# Patient Record
Sex: MALE | Race: WHITE | NOT HISPANIC OR LATINO | URBAN - METROPOLITAN AREA
[De-identification: names, ages, dates, MRNs, and addresses within clinical notes are randomized per-mention and may not be internally consistent; named-entity substitution may affect disease eponyms.]

---

## 2020-01-30 ENCOUNTER — EMERGENCY (EMERGENCY)
Facility: HOSPITAL | Age: 70
LOS: 1 days | Discharge: HOME | End: 2020-01-30
Attending: EMERGENCY MEDICINE | Admitting: EMERGENCY MEDICINE
Payer: MEDICARE

## 2020-01-30 VITALS
SYSTOLIC BLOOD PRESSURE: 137 MMHG | WEIGHT: 186.95 LBS | TEMPERATURE: 98 F | RESPIRATION RATE: 15 BRPM | OXYGEN SATURATION: 99 % | DIASTOLIC BLOOD PRESSURE: 83 MMHG | HEIGHT: 72 IN | HEART RATE: 68 BPM

## 2020-01-30 VITALS
HEART RATE: 64 BPM | DIASTOLIC BLOOD PRESSURE: 77 MMHG | RESPIRATION RATE: 18 BRPM | OXYGEN SATURATION: 98 % | SYSTOLIC BLOOD PRESSURE: 126 MMHG | TEMPERATURE: 98 F

## 2020-01-30 DIAGNOSIS — S61.213A LACERATION WITHOUT FOREIGN BODY OF LEFT MIDDLE FINGER WITHOUT DAMAGE TO NAIL, INITIAL ENCOUNTER: ICD-10-CM
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
## 2020-01-30 DIAGNOSIS — S60.445A EXTERNAL CONSTRICTION OF LEFT RING FINGER, INITIAL ENCOUNTER: ICD-10-CM

## 2020-01-30 DIAGNOSIS — Z23 ENCOUNTER FOR IMMUNIZATION: ICD-10-CM

## 2020-01-30 DIAGNOSIS — S61.217A LACERATION WITHOUT FOREIGN BODY OF LEFT LITTLE FINGER WITHOUT DAMAGE TO NAIL, INITIAL ENCOUNTER: ICD-10-CM
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
## 2020-01-30 DIAGNOSIS — S61.215A LACERATION WITHOUT FOREIGN BODY OF LEFT RING FINGER WITHOUT DAMAGE TO NAIL, INITIAL ENCOUNTER: ICD-10-CM

## 2020-01-30 PROCEDURE — 99283 EMERGENCY DEPT VISIT LOW MDM: CPT | Mod: 25

## 2020-01-30 PROCEDURE — 73130 X-RAY EXAM OF HAND: CPT | Mod: 26,LT

## 2020-01-30 RX ORDER — CEPHALEXIN 500 MG
500 CAPSULE ORAL ONCE
Refills: 0 | Status: COMPLETED | OUTPATIENT
Start: 2020-01-30 | End: 2020-01-30

## 2020-01-30 RX ORDER — IBUPROFEN 200 MG
400 TABLET ORAL ONCE
Refills: 0 | Status: COMPLETED | OUTPATIENT
Start: 2020-01-30 | End: 2020-01-30

## 2020-01-30 RX ORDER — TETANUS TOXOID, REDUCED DIPHTHERIA TOXOID AND ACELLULAR PERTUSSIS VACCINE, ADSORBED 5; 2.5; 8; 8; 2.5 [IU]/.5ML; [IU]/.5ML; UG/.5ML; UG/.5ML; UG/.5ML
0.5 SUSPENSION INTRAMUSCULAR ONCE
Refills: 0 | Status: COMPLETED | OUTPATIENT
Start: 2020-01-30 | End: 2020-01-30

## 2020-01-30 RX ORDER — CEPHALEXIN 500 MG
1 CAPSULE ORAL
Qty: 28 | Refills: 0
Start: 2020-01-30 | End: 2020-02-05

## 2020-01-30 RX ADMIN — Medication 500 MILLIGRAM(S): at 20:43

## 2020-01-30 RX ADMIN — Medication 400 MILLIGRAM(S): at 20:43

## 2020-01-30 RX ADMIN — TETANUS TOXOID, REDUCED DIPHTHERIA TOXOID AND ACELLULAR PERTUSSIS VACCINE, ADSORBED 0.5 MILLILITER(S): 5; 2.5; 8; 8; 2.5 SUSPENSION INTRAMUSCULAR at 20:43

## 2020-01-30 NOTE — ED ADULT NURSE REASSESSMENT NOTE - NS ED NURSE REASSESS COMMENT FT1
Dr. Hanna just finished suturing hand. Tetanus, Motrin, and Keflex administered. Pt states he is not currently in any pn. Will continue to monitor. Side rails up, safety is maintained.

## 2020-01-30 NOTE — ED PROVIDER NOTE - SKIN, MLM
LUE: .25 cm superficial laceration over dorsum of left 3rd digit. left 4th digit with 1 cm laceration with irregular margin overlying PIP with active venous bleed; able to flex and extend at DIP and PIP. left 5th digit with 1 cm laceration over lateral DIP, no active bleeding, able to flex and extend at DIP and PIP LUE: .25 cm superficial laceration over dorsum of left 3rd digit. left 4th digit with 1 cm laceration with irregular margin overlying PIP with active venous bleed; able to flex and extend at DIP and PIP. left 5th digit with 1 cm laceration over lateral PIP, no active bleeding, able to flex and extend at DIP and PIP.  Cap refill and distal tip sensation intact.

## 2020-01-30 NOTE — ED PROVIDER NOTE - DIAGNOSTIC INTERPRETATION
ER Physician: Monster Montiel  INTERPRETATION:  no acute fracture; no soft tissue swelling noted; normal bony alignment.  ? punctate fb on the radial aspect of the 5th digit

## 2020-01-30 NOTE — ED PROVIDER NOTE - OBJECTIVE STATEMENT
61 yo RHD male presents with lacerations to left 3rd, 4th and 5th digits. Pt sustained laceration last night at approximately 9:30PM when he cut his fingers on a broken pyrex bowl. Pt 59 yo RHD male presents with lacerations to left 3rd, 4th and 5th digits. Pt sustained laceration last night at approximately 9:30PM when he cut his fingers on a broken pyrex bowl. Pt ran fingers under cold water and wrapped fingers with bandaids. Pt reports increasing pain to fingers and went to [facility] for evaluation 3:30PM. Tetanus status is unknown. 70 yo RHD male presents with lacerations to left 3rd, 4th and 5th digits. Pt sustained laceration last night at approximately 9:30PM when he cut his fingers on a broken pyrex bowl. Pt ran fingers under cold water and wrapped fingers with bandaids. Pt reports increasing pain to fingers and went to [facility] for evaluation 3:30PM. Tetanus status is unknown.  Denies fingertip numbness or paresthesias.

## 2020-01-30 NOTE — ED ADULT TRIAGE NOTE - CHIEF COMPLAINT QUOTE
left hand laceration (cut with glass baking dish last pm), sent from urgent care for eval (unable to remove wedding ring d/t swelling)

## 2020-01-30 NOTE — ED PROVIDER NOTE - CLINICAL SUMMARY MEDICAL DECISION MAKING FREE TEXT BOX
59 yo RHD male presents with left hand injury. Pt was cut with sharp object at approximately 9PM last night. bleeding and pain improved last night, however pt reported worsening pain and swelling at approximately 3PM today. Pt was sent in from [facility] for evaluation. Pt with ring on left ring finger; unable to remove. Pt consent for ring removal with ring drill; ring removal successful, see procedure note. Other hand injuries repaired by Dr. Hanna; pt will follow up with Dr Hanna for wound care and suture removal. Will update Tetanus. xray of hand ordered to rule out foreign body; IBU and keflex ordered. 68 yo RHD male presents with left hand injury. Pt was cut with sharp object at approximately 9PM last night. bleeding and pain improved last night, however pt reported worsening pain at approximately 3PM today which prompted him to get evaluated.  Pt was sent in from [facility] for evaluation. Pt with ring on left ring finger and unable to remove. Patient requested and consented for ring removal with ring drill; ring removal successful, see procedure note for details. Other hand injuries repaired by Dr. Hanna;  pt will follow up with Dr Hanna for wound care and suture removal. Will update Tetanus. xray of hand with evidence of ? small punctate FB on the radial aspect of the fifth digit.  Conservative management discussed with the patient in detail.   Strict ED return instructions discussed in detail and patient given the opportunity to ask any questions about their discharge diagnosis and instructions

## 2020-01-30 NOTE — ED PROVIDER NOTE - PATIENT PORTAL LINK FT
You can access the FollowMyHealth Patient Portal offered by [facility] by registering at the following website: [email or web address]. By joining [employer]’s FollowMyHealth portal, you will also be able to view your health information using other applications (apps) compatible with our system.

## 2020-01-30 NOTE — ED PROVIDER NOTE - NSFOLLOWUPINSTRUCTIONS_ED_ALL_ED_FT
Please follow up with Dr. Hanna as instructed.  Take the medication as prescribed.      Laceration    A laceration is a cut that goes through all of the layers of the skin and into the tissue that is right under the skin. Some lacerations heal on their own. Others need to be closed with skin adhesive strips, skin glue, stitches (sutures), or staples. Proper laceration care minimizes the risk of infection and helps the laceration to heal better.  If non-absorbable stitches or staples have been placed, they must be taken out within the time frame instructed by your healthcare provider.    SEEK IMMEDIATE MEDICAL CARE IF YOU HAVE ANY OF THE FOLLOWING SYMPTOMS: swelling around the wound, worsening pain, drainage from the wound, red streaking going away from your wound, inability to move finger or toe near the laceration, or discoloration of skin near the laceration.

## 2020-06-05 ENCOUNTER — APPOINTMENT (OUTPATIENT)
Dept: PODIATRY | Facility: CLINIC | Age: 70
End: 2020-06-05
Payer: MEDICARE

## 2020-06-05 VITALS
DIASTOLIC BLOOD PRESSURE: 60 MMHG | SYSTOLIC BLOOD PRESSURE: 100 MMHG | WEIGHT: 180 LBS | HEIGHT: 72 IN | BODY MASS INDEX: 24.38 KG/M2

## 2020-06-05 DIAGNOSIS — Z78.9 OTHER SPECIFIED HEALTH STATUS: ICD-10-CM

## 2020-06-05 DIAGNOSIS — F41.9 ANXIETY DISORDER, UNSPECIFIED: ICD-10-CM

## 2020-06-05 DIAGNOSIS — S92.502A DISPLACED UNSPECIFIED FRACTURE OF LEFT LESSER TOE(S), INITIAL ENCOUNTER FOR CLOSED FRACTURE: ICD-10-CM

## 2020-06-05 DIAGNOSIS — E78.00 PURE HYPERCHOLESTEROLEMIA, UNSPECIFIED: ICD-10-CM

## 2020-06-05 PROBLEM — Z00.00 ENCOUNTER FOR PREVENTIVE HEALTH EXAMINATION: Status: ACTIVE | Noted: 2020-06-05

## 2020-06-05 PROCEDURE — 73630 X-RAY EXAM OF FOOT: CPT

## 2020-06-05 PROCEDURE — 28510 TREATMENT OF TOE FRACTURE: CPT

## 2020-06-05 PROCEDURE — 99203 OFFICE O/P NEW LOW 30 MIN: CPT | Mod: 25

## 2020-06-05 RX ORDER — ATORVASTATIN CALCIUM 10 MG/1
10 TABLET, FILM COATED ORAL
Refills: 0 | Status: ACTIVE | COMMUNITY

## 2020-06-05 RX ORDER — BUPROPION HYDROCHLORIDE 300 MG/1
300 TABLET, EXTENDED RELEASE ORAL
Refills: 0 | Status: ACTIVE | COMMUNITY

## 2020-06-05 RX ORDER — GABAPENTIN 600 MG/1
600 TABLET, FILM COATED ORAL
Refills: 0 | Status: ACTIVE | COMMUNITY

## 2020-06-05 NOTE — PROCEDURE
[FreeTextEntry1] : X-rays were taken in the office multiple views of the left foot\par There is evidence of a longitudinal fracture of the proximal phalanx \par \par after examination and reviewing of the x-rays with the patient it was determined that there was a fracture present. Possible etiologies and treatment options both conservative as well as surgical were discussed at length with the patient. Approximate healing times were discussed with the patient both surgical as well as conservative treatments. It was stressed that these are estimates and there are many variables regarding healing the patient to patient. The fracture care, fracture principles, and the following appropriate treatment advice was stressed to the patient. Compliance was also stressed. I went over my treatment suggestions which included but were not limited to be my advice on which treatment the patient should pursue, appropriate offloading if necessary, shoe gear and assistive devices, immobilization or splinting and possible orthotic therapy. I advised the patient that medical noncompliance regarding treatment and treatment suggestions could result in worsening of the fracture which could require future surgical intervention, nonhealing of the fracture, and long-term deformity and disability as well as chronic pain.\par A complete and thorough evaluation of the type of shoes they should be wearing and type of shoes for this time of year was discussed with patient.\par \par I have advised the patient that they may return to normal activity level but to limit it to tolerance. I advised them at this time that they do not need any assistive device special shoe bandaging or bracing. I also advised that should they be doing her normal daily function and they should experience some pain that upon finishing that they should return to anti-inflammatory medication over-the-counter if possible, ice and elevation. If this continues more than 24-48 hours he should contact the office immediately for followup appointment\par

## 2020-06-05 NOTE — REVIEW OF SYSTEMS
[Anxiety] : anxiety [As Noted in HPI] : as noted in HPI [Negative] : Neurological [de-identified] : mild evidence of contusion of nailbed

## 2020-06-05 NOTE — PHYSICAL EXAM
[General Appearance - In No Acute Distress] : in no acute distress [General Appearance - Alert] : alert [Full Pulse] : the pedal pulses are present [Edema] : there was no peripheral edema [Sensation] : the sensory exam was normal to light touch and pinprick [Deep Tendon Reflexes (DTR)] : deep tendon reflexes were 2+ and symmetric [No Focal Deficits] : no focal deficits [Oriented To Time, Place, And Person] : oriented to person, place, and time [Impaired Insight] : insight and judgment were intact [Affect] : the affect was normal [FreeTextEntry1] : Lethargic again noted, fourth toe left foot with fusiform swelling, ecchymosis and a mild abrasion to the tone some dried blood underneath. There is no deviation in the joint, both PIP and DIPs, no subluxation or dislocation evident

## 2020-06-05 NOTE — HISTORY OF PRESENT ILLNESS
[FreeTextEntry1] : Location:4 the prox phalkanx left foot\par Duration: 2 days, went to [facility], xrays N/A\par Acute:yes\par Past Tx: [facility], xrays\par Exacerbated by:\par Prior Hx:\par

## 2020-09-16 ENCOUNTER — APPOINTMENT (OUTPATIENT)
Dept: PODIATRY | Facility: CLINIC | Age: 70
End: 2020-09-16
Payer: MEDICARE

## 2020-09-16 VITALS — HEIGHT: 72 IN | WEIGHT: 180 LBS | BODY MASS INDEX: 24.38 KG/M2

## 2020-09-16 PROCEDURE — 17110 DESTRUCTION B9 LES UP TO 14: CPT

## 2020-09-16 PROCEDURE — 99213 OFFICE O/P EST LOW 20 MIN: CPT | Mod: 25

## 2020-09-16 NOTE — PHYSICAL EXAM
[General Appearance - In No Acute Distress] : in no acute distress [General Appearance - Alert] : alert [No Joint Swelling] : no joint swelling [Normal Foot/Ankle] : Both lower extremities were exposed and visualized. Standing exam demonstrates normal foot posture and alignment. Hindfoot exam shows no hindfoot valgus or varus [Skin Color & Pigmentation] : normal skin color and pigmentation [Skin Turgor] : normal skin turgor [] : no rash [Sensation] : the sensory exam was normal to light touch and pinprick [Motor Exam] : the motor exam was normal [Deep Tendon Reflexes (DTR)] : deep tendon reflexes were 2+ and symmetric [No Focal Deficits] : no focal deficits [Oriented To Time, Place, And Person] : oriented to person, place, and time [Impaired Insight] : insight and judgment were intact [Affect] : the affect was normal [FreeTextEntry3] : Vascular exam reveals palpable pedal pulses, the foot is warm to touch, there was good capillary fill time, the skin is normal in appearance there is no evidence of vascular disease or compromise at this time [Foot Ulcer] : no foot ulcer [Skin Induration] : no skin induration [FreeTextEntry1] : visual exam of the area exhibits interruption of skin lines and capillary bleeding, upon debridemment, and classic characteristics seen in a verruca, cauliflower-like appearance, pain on side to side compression but only to the 5th toe. The other two are simple HK from old sx\par

## 2020-09-16 NOTE — PROCEDURE
[FreeTextEntry1] : Plantar wart \par          Clinical Notes: I had a lengthy discussion with the patient today regarding the diagnosis, etiology, differential diagnosis and treatment options of the presenting problem. Risks alternatives and benefits of each treatment which ranged from conservative to surgical were explained in great detail. I also explained the progression of treatment from conservative to surgical and the benefits of each. I stressed conservative treatment (and explained them again) until it no longer provides relief. OTC products and meds discussed and reviewed. All questions asked and answered appropriately to the patient's satisfaction. , I had a lengthy discussion with the patient today regarding the diagnosis, etiology, differential diagnosis and treatment options of the presenting problem. Risks alternatives and benefits of each treatment which ranged from conservative to surgical were explained in great detail. I also explained the progression of treatment from conservative to surgical and the benefits of each. I stressed conservative treatment (and explained them again) until it no longer provides relief. OTC products and meds discussed and reviewed. All questions asked and answered appropriately to the patient's satisfaction., A lengthy informative and educational discussion discussed with the patient. Various treatment options were reviewed. I discussed simply shaving the lesion and applied Cantharone on a biweekly basis, discussed complete excision, another treatment option discussed with treatment the cryotherapy.\par \par Shaving of a benign lesion using a sterile scalpel the skin was debrided to capillary bleeding and Cantharone applied. A dry sterile bandage with discharge instructions was reviewed with the patient and a followup appointment given.. \par \par \par

## 2020-09-16 NOTE — HISTORY OF PRESENT ILLNESS
[FreeTextEntry1] : Location:plantar aspect of left foot x's 2 and left 5th toe laterally\par Duration: plantar : over 5 years hx of excision at those 2 spots, 5th toe "recently"\par Acute: toe\par Chronic: plantar\par Past Tx: excision for pantar and nothing for 5th toe\par Exacerbated by: walking barefoot\par Prior Hx: yes, plantarly only\par

## 2020-09-29 ENCOUNTER — APPOINTMENT (OUTPATIENT)
Dept: PODIATRY | Facility: CLINIC | Age: 70
End: 2020-09-29
Payer: MEDICARE

## 2020-09-29 VITALS — WEIGHT: 180 LBS | BODY MASS INDEX: 24.38 KG/M2 | HEIGHT: 72 IN

## 2020-09-29 PROCEDURE — 17110 DESTRUCTION B9 LES UP TO 14: CPT

## 2020-09-29 NOTE — PHYSICAL EXAM
[FreeTextEntry1] : visual exam of the area exhibits interruption of skin lines and capillary bleeding, upon debridemment, and classic characteristics seen in a verruca, cauliflower-like appearance, pain on side to side compression but only to the 5th toe.\par

## 2020-09-29 NOTE — PROCEDURE
[FreeTextEntry1] : Shaving of a benign lesion using a sterile scalpel the skin was debrided to capillary bleeding and Cantharone applied. A dry sterile bandage with discharge instructions was reviewed with the patient and a followup appointment given.. \par \par \par follow up appt 2 weeks\par

## 2020-09-29 NOTE — HISTORY OF PRESENT ILLNESS
[FreeTextEntry1] : Location left 5th toe laterally\par Acute: yes\par Chronic: plantar\par Past Tx: excision for pantar and nothing for 5th toe\par Exacerbated by: walking barefoot\par

## 2020-10-13 ENCOUNTER — APPOINTMENT (OUTPATIENT)
Dept: PODIATRY | Facility: CLINIC | Age: 70
End: 2020-10-13
Payer: MEDICARE

## 2020-10-13 VITALS
WEIGHT: 170 LBS | DIASTOLIC BLOOD PRESSURE: 60 MMHG | OXYGEN SATURATION: 99 % | HEIGHT: 72 IN | SYSTOLIC BLOOD PRESSURE: 110 MMHG | HEART RATE: 77 BPM | BODY MASS INDEX: 23.03 KG/M2

## 2020-10-13 DIAGNOSIS — B07.8 OTHER VIRAL WARTS: ICD-10-CM

## 2020-10-13 DIAGNOSIS — B07.0 PLANTAR WART: ICD-10-CM

## 2020-10-13 PROCEDURE — 99213 OFFICE O/P EST LOW 20 MIN: CPT

## 2020-10-13 NOTE — PROCEDURE
[FreeTextEntry1] : I lengthy discussion with the patient today regarding the causes, etiology, and treatment options for verruca plantaris. My discussion to focus mainly on prevention since there is a high recurrence rate from this type of virus. My discussion included but was not limited to overall foot health, foot hygiene, the risks of walking barefoot especially in public places and the need to be conscious of the cleanliness of facilities where not only in a walk barefoot but other people walk barefoot as well. All questions were asked and answered appropriately and educational literature was dispensed\par I had a lengthy discussion with the patient regarding the causes etiologies and treatment options for verruca plantaris. My discussion focused mainly on prevention since there is a high recurrence rate. My discussion included but was not limited to overall foot, the foot hygiene, the risks of walking barefoot especially in public places and the need to be conscious of the cleanliness of walking barefoot. All questions were asked and answered appropriately, and educational literature was supplied.\par \par \par \par follow up appt 4 weeks if any symptoms present\par

## 2020-10-13 NOTE — PHYSICAL EXAM
[No Joint Swelling] : no joint swelling [Normal Foot/Ankle] : Both lower extremities were exposed and visualized. Standing exam demonstrates normal foot posture and alignment. Hindfoot exam shows no hindfoot valgus or varus [Skin Color & Pigmentation] : normal skin color and pigmentation [Skin Turgor] : normal skin turgor [] : no rash [Skin Lesions] : no skin lesions [FreeTextEntry3] : Vascular exam reveals palpable pedal pulses, the foot is warm to touch, there was good capillary fill time, the skin is normal in appearance there is no evidence of vascular disease or compromise at this time [Foot Ulcer] : no foot ulcer [Skin Induration] : no skin induration [FreeTextEntry1] : visual exam of the area exhibits continuity skin lines and without capillary bleeding, upon debridement, \par

## 2023-04-20 ENCOUNTER — APPOINTMENT (OUTPATIENT)
Dept: NEUROLOGY | Facility: CLINIC | Age: 73
End: 2023-04-20
Payer: MEDICARE

## 2023-04-20 ENCOUNTER — NON-APPOINTMENT (OUTPATIENT)
Age: 73
End: 2023-04-20

## 2023-04-20 VITALS
SYSTOLIC BLOOD PRESSURE: 144 MMHG | HEIGHT: 72 IN | WEIGHT: 185 LBS | DIASTOLIC BLOOD PRESSURE: 81 MMHG | BODY MASS INDEX: 25.06 KG/M2 | OXYGEN SATURATION: 97 % | HEART RATE: 73 BPM

## 2023-04-20 DIAGNOSIS — R51.9 HEADACHE, UNSPECIFIED: ICD-10-CM

## 2023-04-20 DIAGNOSIS — G45.9 TRANSIENT CEREBRAL ISCHEMIC ATTACK, UNSPECIFIED: ICD-10-CM

## 2023-04-20 DIAGNOSIS — G25.81 RESTLESS LEGS SYNDROME: ICD-10-CM

## 2023-04-20 PROCEDURE — 99205 OFFICE O/P NEW HI 60 MIN: CPT

## 2023-04-20 RX ORDER — LAMOTRIGINE 25 MG/1
25 TABLET ORAL
Refills: 0 | Status: ACTIVE | COMMUNITY

## 2023-05-10 DIAGNOSIS — G50.1 ATYPICAL FACIAL PAIN: ICD-10-CM

## 2023-05-11 ENCOUNTER — RESULT REVIEW (OUTPATIENT)
Age: 73
End: 2023-05-11

## 2023-05-11 PROBLEM — G45.9 TRANSIENT CEREBRAL ISCHEMIA, UNSPECIFIED TYPE: Status: ACTIVE | Noted: 2023-05-11

## 2023-05-11 NOTE — REASON FOR VISIT
contrast induced     - IVF as per nephro  nephrology following   continue to monitor Cr [Consultation] : a consultation visit [FreeTextEntry1] : pt states he get headaches and hurts w/ cough  contrast induced     - IVF as per nephro  Bumex gtt as above. Discussed with Dr. Eid and hospitalist. Called and discussed with pts daughter Petty as well.  nephrology following   continue to monitor Cr

## 2023-05-11 NOTE — PHYSICAL EXAM
[FreeTextEntry1] : General: No acute distress, Awake, Alert.  \par \par \par Mental status  \par \par Awake, alert, and oriented to person, time and place, Normal attention span and concentration, Recent and remote memory intact, Language intact, Fund of knowledge intact.  \par \par Delayed recall 3/3  \par \par \par Cranial Nerves  \par \par II: VFF  \par \par III, IV, VI: PERRL, EOMI. \par \par V: Facial sensation is normal B/L.  \par \par VII: Facial strength is normal B/L.  \par \par VIII: Gross hearing is intact.  \par \par IX, X: Palate is midline and elevates symmetrically.  \par \par XI: Trapezius normal strength.  \par \par XII: Tongue midline without atrophy or fasciculations.  \par \par   \par \par Motor exam  \par \par Muscle tone - no evidence of rigidity or resistance in all 4 extremities.  \par No atrophy or fasciculations  \par Muscle Strength: arms and legs, proximal and distal flexors and extensors are normal  \par \par No UE drift. \par \par   \par \par Reflexes  \par \par All present, normal, and symmetrical.  \par Plantars right: mute.  \par Plantars left: mute.  \par \par   \par \par Coordination  \par \par Finger to nose: Normal.  \par Heel to shin: Normal.  \par   \par \par Sensory  \par \par Intact sensation to PP, vibration and cold.  \par \par \par \par Gait  \par \par Normal including heels, toes, and tandem gait. Romberg positive \par

## 2023-05-11 NOTE — ASSESSMENT
[FreeTextEntry1] : start taking gabapentin 600 mg BID to see if decrease in headache (currently on gabapentin 600 mg at bedtime)\par \par MRI w/wo contrast\par MRA w/o contrast\par Start headache diary\par Encouraged to increase water intake 2 L daily\par \par \par Follow up in 2 months

## 2023-05-11 NOTE — HISTORY OF PRESENT ILLNESS
[FreeTextEntry1] : Po Bradford is a 73 yo male with PMH anxiety, high cholesterol, atrial fibrillation s/p ablation, headaches here for initial consultation.\par \par Occasional headaches all his life however started to worsen in pandemic, would occasional take Excedrin with complete relief, location behind b/l eyes \par Currently experiencing pressure behind bilateral eyes every time he overexerts or coughs , headaches 1-2 x weekly in the same location that is not relieved with Excedrin, triggering factors include stress, no alleviating factors - no change or blurry vision\par denies nausea/ vomiting\par denies light or noise sensitivity\par transient symptoms of pressure and blurring with headaches\par occ without\par \par \par water intake 3 glasses daily\par caffeine 2 cups of coffee daily\par sleep intermittent varies, 4- hours sometimes more\par exercise little to none\par diet no meat, balanced, needs improvement with portion control\par work- remotely runs own business as , paints with oil based turpentine usually outside sometimes in garage\par seeing therapist on weekly basis\par psychiatrist- MD Paulie Sepulveda

## 2023-07-11 ENCOUNTER — APPOINTMENT (OUTPATIENT)
Dept: NEUROLOGY | Facility: CLINIC | Age: 73
End: 2023-07-11
Payer: MEDICARE

## 2023-07-11 DIAGNOSIS — G44.84 PRIMARY EXERTIONAL HEADACHE: ICD-10-CM

## 2023-07-11 PROCEDURE — 99214 OFFICE O/P EST MOD 30 MIN: CPT | Mod: 95

## 2023-07-11 RX ORDER — INDOMETHACIN 50 MG/1
50 CAPSULE ORAL
Qty: 60 | Refills: 0 | Status: ACTIVE | COMMUNITY
Start: 2023-07-11 | End: 1900-01-01

## 2023-07-11 RX ORDER — MIRTAZAPINE 15 MG/1
15 TABLET, FILM COATED ORAL
Qty: 30 | Refills: 0 | Status: ACTIVE | COMMUNITY
Start: 2023-06-26

## 2023-07-11 RX ORDER — ALBUTEROL SULFATE 90 UG/1
108 (90 BASE) INHALANT RESPIRATORY (INHALATION)
Qty: 7 | Refills: 0 | Status: ACTIVE | COMMUNITY
Start: 2023-06-05

## 2023-07-11 RX ORDER — GABAPENTIN 300 MG/1
300 CAPSULE ORAL
Qty: 120 | Refills: 0 | Status: ACTIVE | COMMUNITY
Start: 2023-01-23

## 2023-07-11 RX ORDER — BUPROPION HYDROCHLORIDE 150 MG/1
150 TABLET, FILM COATED, EXTENDED RELEASE ORAL
Qty: 60 | Refills: 0 | Status: ACTIVE | COMMUNITY
Start: 2023-01-23

## 2023-07-11 NOTE — REASON FOR VISIT
[Home] : at home, [unfilled] , at the time of the visit. [Medical Office: ([facility])___] : at the medical office located in  [Patient] : the patient [Follow-Up: _____] : a [unfilled] follow-up visit

## 2023-07-18 NOTE — DATA REVIEWED
[de-identified] : \par  MRI Report             Final\par \par No Documents Attached\par \par \par \par \par   [facility]\par                                          [address]\par                                    [place], [place]  [address]\par                                        Department of Radiology\par                                             [phone]\par \par \par Patient Name:      JUJU NESS                   Location:       [facility]\par Med Rec #:        [id number]                    Account #:      [id number]\par YOB: 1950                    Ordering:       Agustina Gonzalez NP\par Age: 72               Sex:    M                 Attending:      Agustina Gonzalez NP\par PCP:        Renato Childress MD\par ______________________________________________________________________________________\par \par Exam Date:      05/11/23\par Exam:         MRA BRAIN; MRI BRAIN WAW IC\par Order#:       MRI [id number]; [id number]\par \par \par \par CLINICAL INDICATION: Headache\par \par  Technique: Contrast brain MRI, non-contrast brain MRA was performed.  MR angiography of\par intracranial circulation was performed with time of flight imaging technique. Maximal intensity\par projection images were reviewed in multiple planes.\par \par  7 cc of Gadavist were administered intravenously, 0.5 cc were discarded.\par \par  COMPARISON: None.\par \par  FINDINGS:\par \par  Brain MRI:\par  Mild prominence of the sulci and ventricles are consistent with age-appropriate volume loss. Few\par scattered tiny SWI hypointensities in both cerebral and cerebellar hemispheres likely represent hem\par osiderin deposition due to old microhemorrhages. There is no intraparenchymal hematoma, mass effect\par or midline shift. No areas of abnormal restrictive diffusion are present to suggest acute or\par subacute infarction. No abnormal extra-axial fluid collections are present.\par \par  There is a 5 mm well-circumscribed cystic lesion in the sella situated between the anterior and\par intermediate lobes of the pituitary, favoring a Rathke's cleft cyst.\par \par  The calvarium is intact. Orbits, paranasal sinuses and tympanomastoid cavities appear free of acute\par disease. There is minimal mucosal thickening within the bilateral ethmoid air cells and small mucous\par retention cysts versus polyps in the sphenoid and left maxillary sinuses.\par \par  Brain MRA:\par  Internal carotid arteries demonstrate unremarkable antegrade flow related enhancement to the [place]\par of [place] bilaterally. The anterior and middle cerebral arteries  and anterior communicating artery\par are unremarkable. There is no evidence of aneurysm, vascular malformation or occlusion.\par \par  The vertebral arteries are unremarkable to the vertebrobasilar confluence. Branch vasculature of\par the posterior circulation is within normal limits.\par \par \par  IMPRESSION:\par  No acute intracranial abnormality. No abnormal enhancement. 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\par favoring a Rathke's cleft cyst.\par \par  Intracranial MR angiography is unremarkable by NASCET criteria.\par \par  --- End of Report ---\par \par ***Electronically Signed ***\par -----------------------------------------------\par Duy Blanco MD              05/12/23 1004\par \par Dictated on 05/12/23\par \par \par Report cc:  Agustina Gonzalez NP;\par \par  \par \par  Ordered by: AGUSTINA GONZALEZ       Collected/Examined: 11May2023 09:20AM       \par Verified by: ROXANE THOMPSON 15May2023 05:06PM       \par  Result Communication: Discussed results with patient;\par Stage: Final       \par  Performed at: [facility]       Resulted: 12May2023 10:04AM       Last Updated: 15May2023 05:06PM       Accession: [id number]

## 2023-07-18 NOTE — ASSESSMENT
[FreeTextEntry1] : Continue taking gabapentin 1200 mg qhs \par \par Imaging reviewed\par ? microbleeds (repeat scan as needed on in 2 years-)\par \par Continue headache diary\par Encouraged to increase water intake 2 L daily\par Stress management\par \par Rescue \par Indomethacin plus/- 1/2 cup; coffee\par \par \par Follow up in 8 months

## 2023-09-25 NOTE — PHYSICAL EXAM
[FreeTextEntry1] : General: No acute distress, Awake, Alert.  \par \par \par Mental status  \par \par Awake, alert, and oriented to person, time and place, Normal attention span and concentration, Recent and remote memory intact, Language intact, Fund of knowledge intact.  \par \par Delayed recall 3/3  \par \par \par Cranial Nerves  \par \par II: VFF  \par \par III, IV, VI: PERRL, EOMI. \par \par V: Facial sensation is normal B/L.  \par \par VII: Facial strength is normal B/L.  \par \par VIII: Gross hearing is intact.  \par \par IX, X: Palate is midline and elevates symmetrically.  \par \par XI: Trapezius normal strength.  \par \par XII: Tongue midline without atrophy or fasciculations.  \par \par   \par \par Motor exam  \par \par Muscle tone - no evidence of rigidity or resistance in all 4 extremities.  \par No atrophy or fasciculations  \par Muscle Strength: arms and legs, proximal and distal flexors and extensors are normal  \par \par No UE drift. \par \par   \par \par Reflexes  \par \par All present, normal, and symmetrical.  \par Plantars right: mute.  \par Plantars left: mute.  \par \par   \par \par Coordination  \par \par Finger to nose: Normal.  \par Heel to shin: Normal.  \par   \par \par Sensory  \par \par Intact sensation to PP, vibration and cold.  \par \par \par \par Gait  \par \par Normal including heels, toes, and tandem gait. Romberg positive \par 
Negative

## 2024-03-04 ENCOUNTER — APPOINTMENT (OUTPATIENT)
Dept: NEUROLOGY | Facility: CLINIC | Age: 74
End: 2024-03-04

## 2024-03-21 NOTE — ED PROCEDURE NOTE - NS_EDPROVIDERDISPOUSERTYPE_ED_A_ED
"Followed by outside cardiology, last seen 2/21/24,per review of note in Care Everywhere  "Stable from cv standpoint. "    [facility] 9/22/23- negative for blockages in coronary arteries, recommended to continue medical mgmt of angina.   " Scribe Attestation (For Scribes USE Only)... Attending Attestation (For Attendings USE Only).../Scribe Attestation (For Scribes USE Only)...

## 2024-04-02 ENCOUNTER — OFFICE (OUTPATIENT)
Dept: URBAN - METROPOLITAN AREA CLINIC 29 | Facility: CLINIC | Age: 74
Setting detail: OPHTHALMOLOGY
End: 2024-04-02
Payer: MEDICARE

## 2024-04-02 DIAGNOSIS — H17.9: ICD-10-CM

## 2024-04-02 DIAGNOSIS — H35.013: ICD-10-CM

## 2024-04-02 DIAGNOSIS — H25.13: ICD-10-CM

## 2024-04-02 DIAGNOSIS — H25.093: ICD-10-CM

## 2024-04-02 PROCEDURE — 92004 COMPRE OPH EXAM NEW PT 1/>: CPT | Performed by: OPHTHALMOLOGY

## 2024-04-02 ASSESSMENT — LID EXAM ASSESSMENTS
OS_COMMENTS: BLEPHARITIS CHANGES OF THE EYELID MARGINS
OD_COMMENTS: BLEPHARITIS CHANGES OF THE EYELID MARGINS

## 2024-04-26 ENCOUNTER — OFFICE (OUTPATIENT)
Dept: URBAN - METROPOLITAN AREA CLINIC 29 | Facility: CLINIC | Age: 74
Setting detail: OPHTHALMOLOGY
End: 2024-04-26
Payer: MEDICARE

## 2024-04-26 DIAGNOSIS — H25.11: ICD-10-CM

## 2024-04-26 DIAGNOSIS — H25.13: ICD-10-CM

## 2024-04-26 DIAGNOSIS — H17.9: ICD-10-CM

## 2024-04-26 DIAGNOSIS — H35.013: ICD-10-CM

## 2024-04-26 DIAGNOSIS — H25.093: ICD-10-CM

## 2024-04-26 PROCEDURE — 92136 OPHTHALMIC BIOMETRY: CPT | Mod: 26,RT | Performed by: OPHTHALMOLOGY

## 2024-04-26 PROCEDURE — 99213 OFFICE O/P EST LOW 20 MIN: CPT | Performed by: OPHTHALMOLOGY

## 2024-04-26 PROCEDURE — 92136 OPHTHALMIC BIOMETRY: CPT | Mod: TC | Performed by: OPHTHALMOLOGY

## 2024-04-26 ASSESSMENT — LID EXAM ASSESSMENTS
OD_COMMENTS: BLEPHARITIS CHANGES OF THE EYELID MARGINS
OS_COMMENTS: BLEPHARITIS CHANGES OF THE EYELID MARGINS

## 2025-04-02 ENCOUNTER — APPOINTMENT (OUTPATIENT)
Dept: PULMONOLOGY | Facility: CLINIC | Age: 75
End: 2025-04-02
Payer: MEDICARE

## 2025-04-02 DIAGNOSIS — G47.33 OBSTRUCTIVE SLEEP APNEA (ADULT) (PEDIATRIC): ICD-10-CM

## 2025-04-02 DIAGNOSIS — F51.04 PSYCHOPHYSIOLOGIC INSOMNIA: ICD-10-CM

## 2025-04-02 DIAGNOSIS — Z86.69 PERSONAL HISTORY OF OTHER DISEASES OF THE NERVOUS SYSTEM AND SENSE ORGANS: ICD-10-CM

## 2025-04-02 PROCEDURE — 99204 OFFICE O/P NEW MOD 45 MIN: CPT

## 2025-05-28 ENCOUNTER — RESULT REVIEW (OUTPATIENT)
Age: 75
End: 2025-05-28

## 2025-05-28 ENCOUNTER — APPOINTMENT (OUTPATIENT)
Facility: CLINIC | Age: 75
End: 2025-05-28
Payer: MEDICARE

## 2025-05-28 VITALS
DIASTOLIC BLOOD PRESSURE: 75 MMHG | HEART RATE: 82 BPM | WEIGHT: 185 LBS | OXYGEN SATURATION: 99 % | BODY MASS INDEX: 25.06 KG/M2 | HEIGHT: 72 IN | SYSTOLIC BLOOD PRESSURE: 147 MMHG

## 2025-05-28 DIAGNOSIS — G89.29 LOW BACK PAIN, UNSPECIFIED: ICD-10-CM

## 2025-05-28 DIAGNOSIS — M54.50 LOW BACK PAIN, UNSPECIFIED: ICD-10-CM

## 2025-05-28 PROCEDURE — 99203 OFFICE O/P NEW LOW 30 MIN: CPT

## 2025-05-28 PROCEDURE — 72110 X-RAY EXAM L-2 SPINE 4/>VWS: CPT | Mod: 26

## 2025-07-08 ENCOUNTER — APPOINTMENT (OUTPATIENT)
Dept: PULMONOLOGY | Facility: CLINIC | Age: 75
End: 2025-07-08

## 2025-09-03 ENCOUNTER — APPOINTMENT (OUTPATIENT)
Dept: PULMONOLOGY | Facility: CLINIC | Age: 75
End: 2025-09-03
Payer: MEDICARE

## 2025-09-03 PROCEDURE — G2211 COMPLEX E/M VISIT ADD ON: CPT | Mod: 93

## 2025-09-03 PROCEDURE — 99212 OFFICE O/P EST SF 10 MIN: CPT | Mod: 93
